# Patient Record
Sex: MALE | Race: WHITE | Employment: PART TIME | URBAN - METROPOLITAN AREA
[De-identification: names, ages, dates, MRNs, and addresses within clinical notes are randomized per-mention and may not be internally consistent; named-entity substitution may affect disease eponyms.]

---

## 2019-04-17 ENCOUNTER — OFFICE VISIT (OUTPATIENT)
Dept: URGENT CARE | Facility: CLINIC | Age: 29
End: 2019-04-17

## 2019-04-17 VITALS
HEIGHT: 73 IN | BODY MASS INDEX: 19.88 KG/M2 | DIASTOLIC BLOOD PRESSURE: 88 MMHG | HEART RATE: 79 BPM | OXYGEN SATURATION: 97 % | SYSTOLIC BLOOD PRESSURE: 131 MMHG | TEMPERATURE: 98 F | RESPIRATION RATE: 18 BRPM | WEIGHT: 150 LBS

## 2019-04-17 DIAGNOSIS — S61.219A FINGER LACERATION, INITIAL ENCOUNTER: Primary | ICD-10-CM

## 2019-04-17 PROCEDURE — 90714 TD VACCINE GREATER THAN OR EQUAL TO 7YO WITH PRESERVATIVE IM: ICD-10-PCS | Mod: S$GLB,,, | Performed by: NURSE PRACTITIONER

## 2019-04-17 PROCEDURE — 90714 TD VACC NO PRESV 7 YRS+ IM: CPT | Mod: S$GLB,,, | Performed by: NURSE PRACTITIONER

## 2019-04-17 PROCEDURE — 12001 LACERATION REPAIR: ICD-10-PCS | Mod: S$GLB,,, | Performed by: NURSE PRACTITIONER

## 2019-04-17 PROCEDURE — 90471 TD VACCINE GREATER THAN OR EQUAL TO 7YO WITH PRESERVATIVE IM: ICD-10-PCS | Mod: S$GLB,,, | Performed by: NURSE PRACTITIONER

## 2019-04-17 PROCEDURE — 12001 RPR S/N/AX/GEN/TRNK 2.5CM/<: CPT | Mod: S$GLB,,, | Performed by: NURSE PRACTITIONER

## 2019-04-17 PROCEDURE — 99203 PR OFFICE/OUTPT VISIT, NEW, LEVL III, 30-44 MIN: ICD-10-PCS | Mod: 25,S$GLB,, | Performed by: NURSE PRACTITIONER

## 2019-04-17 PROCEDURE — 90471 IMMUNIZATION ADMIN: CPT | Mod: S$GLB,,, | Performed by: NURSE PRACTITIONER

## 2019-04-17 PROCEDURE — 99203 OFFICE O/P NEW LOW 30 MIN: CPT | Mod: 25,S$GLB,, | Performed by: NURSE PRACTITIONER

## 2019-04-17 RX ORDER — SULFAMETHOXAZOLE AND TRIMETHOPRIM 800; 160 MG/1; MG/1
1 TABLET ORAL 2 TIMES DAILY
Qty: 20 TABLET | Refills: 0 | Status: SHIPPED | OUTPATIENT
Start: 2019-04-17 | End: 2019-04-27

## 2019-04-18 NOTE — PROGRESS NOTES
"Subjective:       Patient ID: Rudolph Zaragoza is a 28 y.o. male.    Vitals:  height is 6' 1" (1.854 m) and weight is 68 kg (150 lb). His temperature is 97.6 °F (36.4 °C). His blood pressure is 131/88 and his pulse is 79. His respiration is 18 and oxygen saturation is 97%.     Chief Complaint: Laceration (Left hand 3rd and 4th digit)    Laceration    The incident occurred 3 to 6 hours ago. Pain location: left hand 3rd and 4th digits. The laceration is 1 cm in size. The laceration mechanism was a metal edge (washing machine). The pain is at a severity of 7/10. The pain is moderate. The pain has been constant since onset. He reports no foreign bodies present. His tetanus status is out of date.       Constitution: Negative for chills, fatigue and fever.   HENT: Negative for congestion and sore throat.    Neck: Negative for painful lymph nodes.   Cardiovascular: Negative for chest pain and leg swelling.   Eyes: Negative for double vision and blurred vision.   Respiratory: Negative for cough and shortness of breath.    Gastrointestinal: Negative for nausea, vomiting and diarrhea.   Genitourinary: Negative for dysuria, frequency and urgency.   Musculoskeletal: Negative for joint pain, joint swelling, muscle cramps and muscle ache.   Skin: Positive for laceration. Negative for color change, pale, rash and erythema.   Allergic/Immunologic: Negative for seasonal allergies.   Neurological: Negative for dizziness, history of vertigo, light-headedness, passing out and headaches.   Hematologic/Lymphatic: Negative for swollen lymph nodes, easy bruising/bleeding and history of blood clots. Does not bruise/bleed easily.   Psychiatric/Behavioral: Negative for nervous/anxious, sleep disturbance and depression. The patient is not nervous/anxious.        Objective:      Physical Exam   Constitutional: He is oriented to person, place, and time. He appears well-developed and well-nourished.   HENT:   Head: Normocephalic and atraumatic. " Head is without abrasion, without contusion and without laceration.   Right Ear: External ear normal.   Left Ear: External ear normal.   Nose: Nose normal.   Mouth/Throat: Oropharynx is clear and moist.   Eyes: Pupils are equal, round, and reactive to light. Conjunctivae, EOM and lids are normal.   Neck: Trachea normal, full passive range of motion without pain and phonation normal. Neck supple.   Cardiovascular: Normal rate, regular rhythm and normal heart sounds.   Pulmonary/Chest: Effort normal and breath sounds normal. No stridor. No respiratory distress.   Musculoskeletal: Normal range of motion.   Neurological: He is alert and oriented to person, place, and time.   Skin: Skin is warm and dry. Capillary refill takes less than 2 seconds. Laceration noted. No abrasion, no bruising, no burn, no ecchymosis, no lesion and no rash noted. No erythema.        4th digit: small 2mm straight line lac to plantar aspect   Second lac approx 1cm distal with 2mm straight line lac. No bleeding,   Full rom of hand, fingers, dip andpip joints, full sensation.    3rd digit: mild ecchymosis and skin abrasion, approx 2mm to plantar aspect, full rom, full sensation   Psychiatric: He has a normal mood and affect. His speech is normal and behavior is normal. Judgment and thought content normal. Cognition and memory are normal.   Nursing note and vitals reviewed.      Laceration Repair  Date/Time: 4/17/2019 7:33 PM  Performed by: Cecilia Galeano NP  Authorized by: Cecilia Galeano NP   Body area: upper extremity  Location details: left ring finger  Foreign bodies: no foreign bodies  Tendon involvement: none  Nerve involvement: none  Vascular damage: no  Irrigation solution: saline  Irrigation method: syringe  Amount of cleaning: standard  Debridement: none  Degree of undermining: none  Approximation: close  Approximation difficulty: simple  Dressing: Steri-Strips  Patient tolerance: Patient tolerated the procedure well with no  immediate complications        Assessment:       1. 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 Discussed suture versus steri strip-patient requesting least invasive.     Finger laceration, initial encounter  -     sulfamethoxazole-trimethoprim 800-160mg (BACTRIM DS) 800-160 mg Tab; Take 1 tablet by mouth 2 (two) times daily. for 10 days  Dispense: 20 tablet; Refill: 0  -     (In Office Administered) Td Vaccine    Other orders  -     Laceration Repair      Patient Instructions   Follow up with your doctor in a few days.  Return to the urgent care or go to the ER if symptoms get worse.    Keep compression dressing on for 24 hours.  Keep area clean/dry.  Start oral antibiotics as directed.  Keep covered with dressing when at work.  Follow up if area with signs of infection: redness, pain, drainage, not healing.  Steri strip will fall off in 7-10 days.      Extremity Laceration: Sutures, Staples, or Tape  A laceration is a cut through the skin. If it is deep, it may require stitches (sutures) or staples to close so it can heal. Minor cuts may be treated with surgical tape closures.   X-rays may be done if something may have entered the skin through the cut. You may also need a tetanus shot if you are not up to date on this vaccination.  Home care  · Follow the health care providers instructions on how to care for the cut.  · Wash your hands with soap and warm water before and after caring for your wound. This is to help prevent infection.  · Keep the wound clean and dry. If a bandage was applied and it becomes wet or dirty, replace it. Otherwise, leave it in place for the first 24 hours, then change it once a day or as directed.  · If sutures or staples were used, clean the wound daily:  · After removing the bandage, wash the area with soap and water. Use a wet cotton swab to loosen and remove any  blood or crust that forms.  · After cleaning, keep the wound clean and dry. Talk with your doctor before applying any antibiotic ointment to the wound. Reapply the bandage.  · You may remove the bandage to shower as usual after the first 24 hours, but do not soak the area in water (no swimming) until the stitches or staples are removed.  · If surgical tape closures were used, keep the area clean and dry. If it becomes wet, blot it dry with a towel.  · The doctor may prescribe an antibiotic cream or ointment to prevent infection. Do not stop taking this medication until you have finished the prescribed course or the doctor tells you to stop. The doctor may also prescribe medications for pain. Follow the doctors instructions for taking these medications.  · Avoid activities that may reopen your wound.  Follow-up care  Follow up with your health care provider. Most skin wounds heal within ten days. However, an infection may sometimes occur despite proper treatment. Therefore, check the wound daily for the signs of infection listed below. Stitches and staples should be removed within 7-14 days. If surgical tape closures were used, you may remove them after 10 days if they have not fallen off by then.   When to seek medical advice  Call your health care provider right away if any of these occur:  · Wound bleeding not controlled by direct pressure  · Signs of infection, including increasing pain in the wound, increasing wound redness or swelling, or pus or bad odor coming from the wound  · Fever of 100.4°F (38ºC) or higher or as directed by your healthcare provider  · Stitches or staples come apart or fall out or surgical tape falls off before 7 days  · Wound edges re-open  · Wound changes colors  · Numbness around the wound   · Decreased movement around the injured area  Date Last Reviewed: 6/14/2015  © 0538-1472 [employer]. [address] [place] [address], [place] [address]. All rights reserved. This information  is not intended as a substitute for professional medical care. Always follow your healthcare professional's instructions.

## 2019-04-18 NOTE — PATIENT INSTRUCTIONS
Follow up with your doctor in a few days.  Return to the urgent care or go to the ER if symptoms get worse.    Keep compression dressing on for 24 hours.  Keep area clean/dry.  Start oral antibiotics as directed.  Keep covered with dressing when at work.  Follow up if area with signs of infection: redness, pain, drainage, not healing.  Steri strip will fall off in 7-10 days.      Extremity Laceration: Sutures, Staples, or Tape  A laceration is a cut through the skin. If it is deep, it may require stitches (sutures) or staples to close so it can heal. Minor cuts may be treated with surgical tape closures.   X-rays may be done if something may have entered the skin through the cut. You may also need a tetanus shot if you are not up to date on this vaccination.  Home care  · Follow the health care providers instructions on how to care for the cut.  · Wash your hands with soap and warm water before and after caring for your wound. This is to help prevent infection.  · Keep the wound clean and dry. If a bandage was applied and it becomes wet or dirty, replace it. Otherwise, leave it in place for the first 24 hours, then change it once a day or as directed.  · If sutures or staples were used, clean the wound daily:  · After removing the bandage, wash the area with soap and water. Use a wet cotton swab to loosen and remove any blood or crust that forms.  · After cleaning, keep the wound clean and dry. Talk with your doctor before applying any antibiotic ointment to the wound. Reapply the bandage.  · You may remove the bandage to shower as usual after the first 24 hours, but do not soak the area in water (no swimming) until the stitches or staples are removed.  · If surgical tape closures were used, keep the area clean and dry. If it becomes wet, blot it dry with a towel.  · The doctor may prescribe an antibiotic cream or ointment to prevent infection. Do not stop taking this medication until you have finished the  prescribed course or the doctor tells you to stop. The doctor may also prescribe medications for pain. Follow the doctors instructions for taking these medications.  · Avoid activities that may reopen your wound.  Follow-up care  Follow up with your health care provider. Most skin wounds heal within ten days. However, an infection may sometimes occur despite proper treatment. Therefore, check the wound daily for the signs of infection listed below. Stitches and staples should be removed within 7-14 days. If surgical tape closures were used, you may remove them after 10 days if they have not fallen off by then.   When to seek medical advice  Call your health care provider right away if any of these occur:  · Wound bleeding not controlled by direct pressure  · Signs of infection, including increasing pain in the wound, increasing wound redness or swelling, or pus or bad odor coming from the wound  · Fever of 100.4°F (38ºC) or higher or as directed by your healthcare provider  · Stitches or staples come apart or fall out or surgical tape falls off before 7 days  · Wound edges re-open  · Wound changes colors  · Numbness around the wound   · Decreased movement around the injured area  Date Last Reviewed: 6/14/2015  © 1208-9658 [employer]. [address] [place] [address], [place] [address]. All rights reserved. This information is not intended as a substitute for professional medical care. Always follow your healthcare professional's instructions.

## 2019-10-04 ENCOUNTER — OFFICE VISIT (OUTPATIENT)
Dept: URGENT CARE | Facility: CLINIC | Age: 29
End: 2019-10-04

## 2019-10-04 VITALS
OXYGEN SATURATION: 99 % | RESPIRATION RATE: 20 BRPM | HEART RATE: 64 BPM | SYSTOLIC BLOOD PRESSURE: 119 MMHG | HEIGHT: 73 IN | WEIGHT: 149.94 LBS | TEMPERATURE: 98 F | BODY MASS INDEX: 19.87 KG/M2 | DIASTOLIC BLOOD PRESSURE: 60 MMHG

## 2019-10-04 DIAGNOSIS — B86 SCABIES: Primary | ICD-10-CM

## 2019-10-04 PROCEDURE — 99203 OFFICE O/P NEW LOW 30 MIN: CPT | Mod: S$GLB,,, | Performed by: NURSE PRACTITIONER

## 2019-10-04 PROCEDURE — 99203 PR OFFICE/OUTPT VISIT, NEW, LEVL III, 30-44 MIN: ICD-10-PCS | Mod: S$GLB,,, | Performed by: NURSE PRACTITIONER

## 2019-10-04 RX ORDER — IVERMECTIN 3 MG/1
15 TABLET ORAL ONCE
Qty: 5 TABLET | Refills: 0 | Status: SHIPPED | OUTPATIENT
Start: 2019-10-04 | End: 2019-10-04

## 2019-10-04 RX ORDER — PERMETHRIN 50 MG/G
CREAM TOPICAL ONCE
Qty: 60 G | Refills: 1 | Status: SHIPPED | OUTPATIENT
Start: 2019-10-04 | End: 2019-10-04

## 2019-10-04 NOTE — PROGRESS NOTES
"Subjective:       Patient ID: Rudolph Zaragoza is a 29 y.o. male.    Vitals:  height is 6' 1" (1.854 m) and weight is 68 kg (149 lb 14.6 oz). His temperature is 98.2 °F (36.8 °C). His blood pressure is 119/60 and his pulse is 64. His respiration is 20 and oxygen saturation is 99%.     Chief Complaint: Scabies    Patient's girlfriend was here last night and dx with scabies, he also has the same symptoms. Has been having symptoms for the last 5 days.     Rash   This is a new problem. The current episode started 1 to 4 weeks ago. The problem has been gradually worsening since onset. The rash is diffuse. The rash is characterized by itchiness and redness. He was exposed to nothing. Pertinent negatives include no congestion, cough, diarrhea, fatigue, fever, shortness of breath, sore throat or vomiting. Past treatments include nothing. The treatment provided no relief.       Constitution: Negative for chills, fatigue and fever.   HENT: Negative for congestion and sore throat.    Neck: Negative for painful lymph nodes.   Cardiovascular: Negative for chest pain and leg swelling.   Eyes: Negative for double vision and blurred vision.   Respiratory: Negative for cough and shortness of breath.    Gastrointestinal: Negative for nausea, vomiting and diarrhea.   Genitourinary: Negative for dysuria, frequency and urgency.   Musculoskeletal: Negative for joint pain, joint swelling, muscle cramps and muscle ache.   Skin: Positive for rash. Negative for color change, pale and erythema.   Allergic/Immunologic: Negative for seasonal allergies.   Neurological: Negative for dizziness, history of vertigo, light-headedness, passing out and headaches.   Hematologic/Lymphatic: Negative for swollen lymph nodes, easy bruising/bleeding and history of blood clots. Does not bruise/bleed easily.   Psychiatric/Behavioral: Negative for nervous/anxious, sleep disturbance and depression. The patient is not nervous/anxious.        Objective:    "   Physical Exam   Constitutional: He is oriented to person, place, and time. He appears well-developed and well-nourished.   HENT:   Head: Normocephalic and atraumatic. Head is without abrasion, without contusion and without laceration.   Right Ear: External ear normal.   Left Ear: External ear normal.   Nose: Nose normal.   Eyes: Pupils are equal, round, and reactive to light. Conjunctivae, EOM and lids are normal.   Neck: Trachea normal, full passive range of motion without pain and phonation normal. Neck supple.   Cardiovascular: Normal rate, regular rhythm and normal heart sounds.   Pulmonary/Chest: Effort normal and breath sounds normal. No stridor. No respiratory distress.   Musculoskeletal: Normal range of motion.   Neurological: He is alert and oriented to person, place, and time.   Skin: Skin is warm, dry and intact. Capillary refill takes less than 2 seconds. Rash noted. No abrasion, no bruising, no burn, no ecchymosis, no laceration and no lesion noted. No erythema.   Diffuse pruritic rash consistent with scabies   Psychiatric: He has a normal mood and affect. His speech is normal and behavior is normal. Judgment and thought content normal. Cognition and memory are normal.   Nursing note and vitals reviewed.      Assessment:       1. Scabies        Plan:         Scabies  -     ivermectin (STROMECTOL) 3 mg Tab; Take 5 tablets (15 mg total) by mouth once. for 1 dose  Dispense: 5 tablet; Refill: 0  -     permethrin (ELIMITE) 5 % cream; Apply topically once. Apply to entire body and leave on for 12 hr then rinse off.  Repeat in 1 week for 1 dose  Dispense: 60 g; Refill: 1      Patient Instructions         Please return here or go to the Emergency Department for any concerns or worsening of condition.  If you were prescribed antibiotics, please take them to completion.  If you were prescribed a narcotic medication, do not drive or operate heavy equipment or machinery while taking these medications.  Please  follow up with your primary care doctor or specialist as needed.    If you  smoke, please stop smoking.  Scabies     To prevent spread of infection, wash clothing, linens, and toys in very hot water.     Scabies is an infection caused by very tiny mites that burrow into the skin. The mites are called Sarcoptes scabiei. They cause severe itching. Though children are most commonly infected, anyone can get scabies. Scabies mites can pass from person to person through close physical contact. They can also be passed through shared clothing, towels, and bedding. Scabies infection is not usually dangerous, but it is uncomfortable. Because it is so contagious, scabies should be treated immediately to keep the infection from spreading.  Symptoms  Symptoms of scabies appear about 2 to 6 weeks after infection in a child or adult who has never had scabies before. A child or adult who has been infected before will experience symptoms much sooner, in 1 to 4 days. Signs of scabies infection may include:  · Intense itching, especially at night or after a hot bath  · Skin irritations that look like hives, insect bites, pimples, or blisters, especially on warmer areas of the body (such as between the fingers, in the armpits, and in the creases of the wrists, elbows, and knees)  · Sores on the body caused by scratching (the sores may become infected)  · [place] created by mites traveling under the skin, which look like lines on the skins surface  Treating scabies infection  Scabies infections are usually treated with a prescription lotion that kills the mites. The lotion must be applied to the entire body from the neck down. This includes the palms of the hands, soles of the feet, groin, and under the fingernails. The lotion must be left on for 8 to 14 hours. In some cases, a second application of lotion is needed a week after the first. Medicines work quickly, but most children and adults continue to have an itchy rash for several  weeks after treatment. Marks on the skin from scabies usually go away in 1 to 2 weeks, but sometimes take a few months to clear.  Preventing spread of the infection  To prevent reinfection and the spread of scabies to others, follow these instructions:  · Wash the infected persons clothing, towels, bed linens, cloth toys, and other personal items in very hot, soapy water. Dry them thoroughly. Do not share among family members.   · Seal items that cant be washed in plastic bags for 2 weeks.  · Vacuum floors and furniture. Throw the vacuum bag away afterward.  · Notify an infected childs school and caregivers so that other children can be checked and treated.  · Keep an infected child home from  or school until the morning after treatment for scabies.  · Warn children not to share items such as clothing and towels with other children.  · You may need to treat all household members who may have been exposed to scabies, whether they show symptoms or not. Talk with your healthcare provider.  · Do not spray your house with chemicals or pesticides. These can be dangerous to your familys health.  When to call the healthcare provider if:  · The infected person has a fever, red streaks, pain, or swelling of the skin.  · Sores get worse or do not heal.  · New rashes appear or itching continues for more than 2 weeks after treatment.   Date Last Reviewed: 6/1/2016  © 4661-3709 [employer]. [address] [place] [address], [place] [address]. All rights reserved. This information is not intended as a substitute for professional medical care. Always follow your healthcare professional's instructions.

## 2019-10-04 NOTE — PATIENT INSTRUCTIONS
Please return here or go to the Emergency Department for any concerns or worsening of condition.  If you were prescribed antibiotics, please take them to completion.  If you were prescribed a narcotic medication, do not drive or operate heavy equipment or machinery while taking these medications.  Please follow up with your primary care doctor or specialist as needed.    If you  smoke, please stop smoking.  Scabies     To prevent spread of infection, wash clothing, linens, and toys in very hot water.     Scabies is an infection caused by very tiny mites that burrow into the skin. The mites are called Sarcoptes scabiei. They cause severe itching. Though children are most commonly infected, anyone can get scabies. Scabies mites can pass from person to person through close physical contact. They can also be passed through shared clothing, towels, and bedding. Scabies infection is not usually dangerous, but it is uncomfortable. Because it is so contagious, scabies should be treated immediately to keep the infection from spreading.  Symptoms  Symptoms of scabies appear about 2 to 6 weeks after infection in a child or adult who has never had scabies before. A child or adult who has been infected before will experience symptoms much sooner, in 1 to 4 days. Signs of scabies infection may include:  · Intense itching, especially at night or after a hot bath  · Skin irritations that look like hives, insect bites, pimples, or blisters, especially on warmer areas of the body (such as between the fingers, in the armpits, and in the creases of the wrists, elbows, and knees)  · Sores on the body caused by scratching (the sores may become infected)  · [place] created by mites traveling under the skin, which look like lines on the skins surface  Treating scabies infection  Scabies infections are usually treated with a prescription lotion that kills the mites. The lotion must be applied to the entire body from the neck down. This  includes the palms of the hands, soles of the feet, groin, and under the fingernails. The lotion must be left on for 8 to 14 hours. In some cases, a second application of lotion is needed a week after the first. Medicines work quickly, but most children and adults continue to have an itchy rash for several weeks after treatment. Marks on the skin from scabies usually go away in 1 to 2 weeks, but sometimes take a few months to clear.  Preventing spread of the infection  To prevent reinfection and the spread of scabies to others, follow these instructions:  · Wash the infected persons clothing, towels, bed linens, cloth toys, and other personal items in very hot, soapy water. Dry them thoroughly. Do not share among family members.   · Seal items that cant be washed in plastic bags for 2 weeks.  · Vacuum floors and furniture. Throw the vacuum bag away afterward.  · Notify an infected childs school and caregivers so that other children can be checked and treated.  · Keep an infected child home from  or school until the morning after treatment for scabies.  · Warn children not to share items such as clothing and towels with other children.  · You may need to treat all household members who may have been exposed to scabies, whether they show symptoms or not. Talk with your healthcare provider.  · Do not spray your house with chemicals or pesticides. These can be dangerous to your familys health.  When to call the healthcare provider if:  · The infected person has a fever, red streaks, pain, or swelling of the skin.  · Sores get worse or do not heal.  · New rashes appear or itching continues for more than 2 weeks after treatment.   Date Last Reviewed: 6/1/2016  © 8296-4130 [employer]. [address] [place] [address], [place] [address]. All rights reserved. This information is not intended as a substitute for professional medical care. Always follow your healthcare professional's instructions.